# Patient Record
(demographics unavailable — no encounter records)

---

## 2024-10-25 NOTE — HISTORY OF PRESENT ILLNESS
[FreeTextEntry1] : 52 yo woman with no prior PMH presents for evaluation of lupus.  Patient's identical twin sister  of lupus complications.  When her sister was dx with lupus patient was tested and they told her that she had dormant lupus.    denies any morning stiffness, joint swelling.  walks 6 miles a day.   + alopecia left side frontal area.  uses hair dye to hide grey hair    denies any alopecia, oral lesions, persistent dry eye or dry mouth, red painful eye, nose bleeding, dysphagia, GERD, hoarseness, facial rashes, photosensitivity, sob, cough, cp, hx of serositis, hx low wbc, plts or anemia that required special treatment, Gi issues, Raynaud's, weakness, DVt/PE.  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 2 drinks a week

## 2024-10-25 NOTE — ASSESSMENT
[FreeTextEntry1] : 54 yo woman with no PMH presents for evaluation of lupus.  Patient has an identical sister that  of lupus complications at age 40. Patient is noted to have positive RF 17 and referred to rheumatology for eval.  Patient has no s/s of RA or lupus at this time.  positive serologies are frequently seen among family members of patient with autoimmune processes.   to dx an autoimmune process you need to have both symptoms and positive serologies.     --patient is to avoid sunbathing and significant stress.  This two are potential triggers for lupus

## 2024-10-25 NOTE — PHYSICAL EXAM
[General Appearance - Well Nourished] : well nourished [General Appearance - Well Developed] : well developed [Sclera] : the sclera and conjunctiva were normal [Hearing Threshold Finger Rub Not [place]] : hearing was normal [Neck Appearance] : the appearance of the neck was normal [Respiration, Rhythm And Depth] : normal respiratory rhythm and effort [Auscultation Breath Sounds / Voice Sounds] : lungs were clear to auscultation bilaterally [Nail Clubbing] : no clubbing  or cyanosis of the fingernails [Musculoskeletal - Swelling] : no joint swelling seen [Motor Tone] : muscle strength and tone were normal [] : no rash [Skin Lesions] : no skin lesions [Affect] : the affect was normal [Mood] : the mood was normal